# Patient Record
Sex: FEMALE | Race: WHITE
[De-identification: names, ages, dates, MRNs, and addresses within clinical notes are randomized per-mention and may not be internally consistent; named-entity substitution may affect disease eponyms.]

---

## 2020-01-13 ENCOUNTER — HOSPITAL ENCOUNTER (EMERGENCY)
Dept: HOSPITAL 61 - ER | Age: 30
Discharge: HOME | End: 2020-01-13
Payer: SELF-PAY

## 2020-01-13 VITALS — SYSTOLIC BLOOD PRESSURE: 127 MMHG | DIASTOLIC BLOOD PRESSURE: 95 MMHG

## 2020-01-13 DIAGNOSIS — R51: ICD-10-CM

## 2020-01-13 DIAGNOSIS — R50.9: ICD-10-CM

## 2020-01-13 DIAGNOSIS — R05: Primary | ICD-10-CM

## 2020-01-13 DIAGNOSIS — F17.200: ICD-10-CM

## 2020-01-13 LAB
INFLUENZA A PATIENT: NEGATIVE
INFLUENZA B PATIENT: NEGATIVE

## 2020-01-13 PROCEDURE — 87804 INFLUENZA ASSAY W/OPTIC: CPT

## 2020-01-13 PROCEDURE — 71046 X-RAY EXAM CHEST 2 VIEWS: CPT

## 2020-01-13 PROCEDURE — 99285 EMERGENCY DEPT VISIT HI MDM: CPT

## 2020-01-13 NOTE — PHYS DOC
Past Medical History


Alcohol Use:  Occasionally





Adult General


Chief Complaint


Chief Complaint:  COUGH





HPI


HPI





Patient is a 29  year old female who presents to the ED today complaining of a 

productive cough, bodyaches, chills, headaches, symptoms began yesterday. Also 

complaining of fevers. Patient is a smoker.





Review of Systems


Review of Systems





Constitutional: Reports fever


Eyes: Denies change in visual acuity, redness, or eye pain []


HENT: Reports nasal congestion, denies sore throat []


Respiratory: Reports cough, denies shortness of breath []


Cardiovascular: No additional information not addressed in HPI []


GI: Denies abdominal pain, nausea, vomiting, bloody stools or diarrhea []


: Denies dysuria or hematuria []


Musculoskeletal: Denies back pain or joint pain []


Integument: Denies rash or skin lesions []


Neurologic: Denies headache, focal weakness or sensory changes []








All other systems were reviewed and found to be within normal limits, except as 

documented in this note.





Current Medications


Current Medications





Current Medications








 Medications


  (Trade)  Dose


 Ordered  [facility]  Start Time


 Stop Time Status Last Admin


Dose Admin


 


 Acetaminophen


  (Tylenol)  1,000 mg  1X  ONCE  1/13/20 16:00


 1/13/20 16:01 DC 1/13/20 16:05


1,000 MG


 


 Ibuprofen


  (Motrin)  600 mg  1X  ONCE  1/13/20 16:00


 1/13/20 16:01 DC 1/13/20 16:05


600 MG


 


 Prednisone


  (Prednisone)  50 mg  1X  ONCE  1/13/20 16:00


 1/13/20 16:01 DC 1/13/20 16:05


50 MG











Allergies


Allergies





Allergies








Coded Allergies Type Severity Reaction Last Updated Verified


 


  No Known Drug Allergies    1/13/20 No











Physical Exam


Physical Exam





Constitutional: Well developed, well nourished, no acute distress, non-toxic 

appearance. []


HENT: Normocephalic, atraumatic, bilateral external ears normal, oropharynx 

moist, no oral exudates, nose normal. []


Eyes: PERRLA, EOMI, conjunctiva normal, no discharge. [] 


Neck: Normal range of motion, no tenderness, supple, no stridor. [] 


Cardiovascular:Heart rate regular rhythm, no murmur []


Lungs & Thorax:  Bilateral breath sounds clear to auscultation []


Abdomen: Bowel sounds normal, soft, no tenderness, no masses, no pulsatile 

masses. [] 


Skin: Warm, dry, no erythema, no rash. [] 


Back: No tenderness, no CVA tenderness. [] 


Extremities: No tenderness, no cyanosis, no clubbing, ROM intact, no edema. [] 


Neurologic: Alert and oriented X 3, normal motor function, normal sensory 

function, no focal deficits noted. []


Psychologic: Affect normal, judgement normal, mood normal. []





Current Patient Data


Vital Signs





                                   Vital Signs








  Date Time  Temp Pulse Resp B/P (MAP) Pulse Ox O2 Delivery O2 Flow Rate FiO2


 


1/13/20 15:35 99.5 151 14 127/95 (106) 98 Room Air  





 99.5       








Lab Values





                                Laboratory Tests








Test


 1/13/20


15:44


 


Influenza Type A Antigen


 Negative


(NEGATIVE)


 


Influenza Type B Antigen


 Negative


(NEGATIVE)











EKG


EKG


[]





Radiology/Procedures


Radiology/Procedures


[]PROCEDURE: CHEST PA & LATERAL





Study: CHEST PA   LATERAL


 


Indication: Cough.


 


Comparison: None.


 


Findings:


 


Unremarkable cardiomediastinal silhouette and kamala. No pneumothorax, lobar


infiltrate or pleural effusion.


 


Impression:


 


No acute radiographic abnormality of the chest.


 


Electronically signed by: PHILLIP GAMBLE MD (1/13/2020 4:07 PM) [facility]














DICTATED and SIGNED BY:     PHILLIP GAMBLE MD


DATE:     01/13/20 1607





Course & Med Decision Making


Course & Med Decision Making


Pertinent Labs and Imaging studies reviewed. (See chart for details)





This is a 29-year-old female patient presenting to the ED today with body aches 

chills headaches, cough, symptoms began yesterday.





Patient's temperature is 99.5, giving Tylenol and Motrin in the ED. Chest x-ray 

is negative for pneumonia, negative influenza A or B the suspect this patient 

has influenza. I will send her home with Tamiflu. Instructed to take 

Tylenol/Motrin for pain or fever. Instructed to follow-up with the PCP in 1-2 

weeks. Instructed to rest and push fluids.





Dragon Disclaimer


Dragon Disclaimer


This electronic medical record was generated, in whole or in part, using a voice

 recognition dictation system.





Departure


Departure


Impression:  


   Primary Impression:  


   Cough


   Additional Impression:  


   Fever


Disposition:  01 HOME, SELF-CARE


Condition:  STABLE


Referrals:  


NO PCP (PCP)


follow up in 1 week with your doctor.


Patient Instructions:  Cough, Adult, Easy-to-Read, Fever, Adult, Easy-to-Read





Additional Instructions:  


You were evaluated in the emergency room, your chest x-ray is negative, your 

influenza test is negative though I highly suspect you have influenza. We'll put

 you on Tamiflu, take it as prescribed until completed. Kindly take 

Tylenol/Motrin for pain or fever. Push fluids, maintain good hand hygiene, rest,

 follow-up with your doctor in 1-2 weeks.


Scripts


Benzonatate (TESSALON PERLE) 100 Mg Capsule


1 CAP PO TID, #30 CAP


   Prov: MARSHALL CLAIRE         1/13/20 


Prednisone (PREDNISONE) 50 Mg Tablet


1 TAB PO DAILY, #5 TAB


   Prov: MARSHALL CLAIRE         1/13/20 


Oseltamivir Phosphate (TAMIFLU) 75 Mg Capsule


1 CAP PO BID, #10 CAP


   Prov: MARSHALL CLAIRE         1/13/20





Problem Qualifiers








   Additional Impression:  


   Fever


   Fever type:  unspecified  Qualified Codes:  R50.9 - Fever, unspecified








MARSHALL CLAIRE              Jan 13, 2020 15:51

## 2020-01-13 NOTE — RAD
Study: CHEST PA   LATERAL

 

Indication: Cough.

 

Comparison: None.

 

Findings:

 

Unremarkable cardiomediastinal silhouette and kamala. No pneumothorax, lobar

infiltrate or pleural effusion.

 

Impression:

 

No acute radiographic abnormality of the chest.

 

Electronically signed by: PHILLIP GAMBLE MD (1/13/2020 4:07 PM) [facility] [place]